# Patient Record
(demographics unavailable — no encounter records)

---

## 2017-03-10 NOTE — ED
General Adult HPI





- General


Chief complaint: Psychiatric Symptoms


Stated complaint: Mental Health


Time Seen by Provider: 03/10/17 12:49


Source: patient, RN notes reviewed, old records reviewed


Mode of arrival: ambulatory


Limitations: no limitations





- History of Present Illness


Initial comments: 





This is a 54-year-old female ER for evaluation of psychiatric not suicidal 

thoughts, patient has history of suicide and depression, she was at [facility] today 

and was sent to ER for evaluation of increasing depression and suicidal ideation

, patient does have a plan wanted chewable tablets and kill herself.





- Related Data


 Home Medications











 Medication  Instructions  Recorded  Confirmed


 


Gabapentin [Neurontin] 300 mg PO BID 02/01/16 03/10/17


 


QUEtiapine FUMARATE [SEROquel XR] 400 mg PO BID 02/01/16 03/10/17


 


Sertraline [Zoloft] 200 mg PO DAILY 02/01/16 03/10/17


 


lamoTRIgine [LaMICtal] 150 mg PO QAM 02/01/16 03/10/17


 


lamoTRIgine [LaMICtal] 200 mg PO HS 02/01/16 03/10/17


 


Albuterol Inhaler [Ventolin Hfa 2 puff INHALATION RT-QID PRN 03/10/17 03/10/17





Inhaler]   


 


Cholecalciferol [Vitamin D3] 2,000 unit PO DAILY 03/10/17 03/10/17


 


Dicyclomine HCl 20 mg PO TID PRN 03/10/17 03/10/17


 


LORazepam [Ativan] 1 mg PO BID PRN 03/10/17 03/10/17


 


Mirtazapine [Remeron] 15 mg PO HS 03/10/17 03/10/17


 


Polyethylene Glycol 3350 [Miralax] 17 gm PO DAILY 03/10/17 03/10/17


 


Stress Formula W/Zinc 1 tab PO DAILY 03/10/17 03/10/17


 


traMADol HCL [Ultram] 50 mg PO Q12H 03/10/17 03/10/17











 Allergies











Allergy/AdvReac Type Severity Reaction Status Date / Time


 


No Known Allergies Allergy   Verified 03/10/17 13:11














Review of Systems


ROS Statement: 


Those systems with pertinent positive or pertinent negative responses have been 

documented in the HPI.





ROS Other: All systems not noted in ROS Statement are negative.





Past Medical History


Past Medical History: Hypertension


History of Any Multi-Drug Resistant Organisms: None Reported


Past Surgical History: Orthopedic Surgery


Past Psychological History: Anxiety, Bipolar, Depression


Smoking Status: Current every day smoker


Past Alcohol Use History: None Reported


Past Drug Use History: None Reported





General Exam


Limitations: no limitations


General appearance: alert, in no apparent distress


Head exam: Present: atraumatic, normocephalic, normal inspection


Eye exam: Present: normal appearance, PERRL, EOMI.  Absent: scleral icterus, 

conjunctival injection, periorbital swelling


ENT exam: Present: normal exam, mucous membranes moist


Neck exam: Present: normal inspection.  Absent: tenderness, meningismus, 

lymphadenopathy


Respiratory exam: Present: normal lung sounds bilaterally.  Absent: respiratory 

distress, wheezes, rales, rhonchi, stridor


Cardiovascular Exam: Present: regular rate, normal rhythm, normal heart sounds.

  Absent: systolic murmur, diastolic murmur, rubs, gallop, clicks


GI/Abdominal exam: Present: soft, normal bowel sounds.  Absent: distended, 

tenderness, guarding, rebound, rigid


Extremities exam: Present: normal inspection, full ROM, normal capillary 

refill.  Absent: tenderness, pedal edema, joint swelling, calf tenderness


Back exam: Present: normal inspection


Neurological exam: Present: alert, oriented X3, CN II-XII intact


Psychiatric exam: Present: normal affect, normal mood


Skin exam: Present: warm, dry, intact, normal color.  Absent: rash





Course


 Vital Signs











  03/10/17





  12:40


 


Temperature 98.1 F


 


Pulse Rate 100


 


Respiratory 18





Rate 


 


Blood Pressure 134/85


 


O2 Sat by Pulse 97





Oximetry 














- Reevaluation(s)


Reevaluation #1: 





03/10/17 13:44


Patient's medically clear for psychiatric evaluation





Medical Decision Making





- Medical Decision Making





54 female the ER for evaluation of mental health, patient was sent to us by [facility] 

will be admitted for psychiatric evaluation and treatment





Disposition


Clinical Impression: 


 Suicidal ideation, Depression





Disposition: TRANSFER TO PSYCH HOSP/UNIT


Condition: Fair


Decision to Admit Reason: Admit from EC
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
## 2017-03-12 NOTE — P.CONS
History of Present Illness





- Reason for Consult


Consult date: 17


Medical management





- History of Present Illness





This is a 54-year-old  female.  Her primary care practitioner is 

Amanda Holder with Dr. Suzanna Voss.  She has a past medical history for 

hypertension on low-fat diet anxiety, depression, bipolar, 2 GI bleeds episodes 

in the past the last one in 2015, tobacco use and dependence.  Patient 

states that she has been depressed and suicidal.  She follows with [id number] and was sent into [facility] emergency center for 

evaluation.  Sodium was 147, potassium 5.2 and chloride 108.  TSH 3.580.  She 

states she was seen by Dr. Farmer on  and was placed on MiraLAX and some 

type of pain pill.





Review of Systems


All systems: negative


Constitutional: Denies chills, Denies fever


Eyes: denies blurred vision, denies pain


Ears, nose, mouth and throat: Denies headache, Denies sore throat


Cardiovascular: Denies chest pain, Denies shortness of breath


Respiratory: Denies cough


Gastrointestinal: Denies abdominal pain, Denies diarrhea, Denies nausea, Denies 

vomiting


Genitourinary: Denies dysuria, Denies hematuria


Musculoskeletal: Denies myalgias


Integumentary: Denies pruritus, Denies rash


Neurological: Denies numbness, Denies weakness


Psychiatric: Reports anxiety, Reports depression, Reports hopelessness, Reports 

suicidal ideation


Endocrine: Denies fatigue, Denies weight change





Past Medical History


Past Medical History: Hypertension


Additional Past Medical History / Comment(s): GI bleed 2 requiring blood 

transfusion the last one in 2015, hypertension on diet control


History of Any Multi-Drug Resistant Organisms: None Reported


Past Surgical History: Hysterectomy, Orthopedic Surgery, Tubal Ligation


Additional Past Surgical History / Comment(s): ACL repair on the right knee, 

right second finger surgery, tubal ligation


Past Psychological History: Anxiety, Bipolar, Depression


Smoking Status: Current every day smoker


Past Alcohol Use History: None Reported


Additional Past Alcohol Use History / Comment(s): Patient is a smoker one pack 

per day for more than 20 years.  She states she was clean from alcohol for 9 

years.  She denies any medical marijuana, marijuana or street drug use.  She is 

single and she has 3 children


Past Drug Use History: None Reported





- Past Family History


  ** Father


Additional Family Medical History / Comment(s): Father  at 75 from COPD.





  ** Mother


Additional Family Medical History / Comment(s): Mother  at age 76 from 

heart failure and COPD





  ** Brother(s)


Additional Family Medical History / Comment(s): Patient has 1 brother with 

history of alcohol abuse currently sober.  Patient does not have any sisters.





Medications and Allergies


 Home Medications











 Medication  Instructions  Recorded  Confirmed  Type


 


Gabapentin [Neurontin] 300 mg PO BID 02/01/16 03/10/17 History


 


QUEtiapine FUMARATE [SEROquel XR] 400 mg PO BID 02/01/16 03/10/17 History


 


Sertraline [Zoloft] 200 mg PO DAILY 02/01/16 03/10/17 History


 


lamoTRIgine [LaMICtal] 150 mg PO QAM 02/01/16 03/10/17 History


 


lamoTRIgine [LaMICtal] 200 mg PO HS 02/01/16 03/10/17 History


 


Albuterol Inhaler [Ventolin Hfa 2 puff INHALATION RT-QID PRN 03/10/17 03/10/17 

History





Inhaler]    


 


Cholecalciferol [Vitamin D3] 2,000 unit PO DAILY 03/10/17 03/10/17 History


 


Dicyclomine HCl 20 mg PO TID PRN 03/10/17 03/10/17 History


 


LORazepam [Ativan] 1 mg PO BID PRN 03/10/17 03/10/17 History


 


Mirtazapine [Remeron] 15 mg PO HS 03/10/17 03/10/17 History


 


Polyethylene Glycol 3350 [Miralax] 17 gm PO DAILY 03/10/17 03/10/17 History


 


Stress Formula W/Zinc 1 tab PO DAILY 03/10/17 03/10/17 History


 


traMADol HCL [Ultram] 50 mg PO Q12H 03/10/17 03/10/17 History











 Allergies











Allergy/AdvReac Type Severity Reaction Status Date / Time


 


No Known Allergies Allergy   Verified 03/10/17 13:11














Physical Exam


Vitals: 


 Vital Signs











  Temp Pulse Resp BP


 


 17 06:40  97.7 F  84  16  114/69








 Intake and Output











 17





 21:59 06:59 14:59


 


Other:   


 


  Weight   71.3 kg








 Patient Weight











 17





 06:59


 


Weight 71.3 kg

















Gen: This is a 54-year-old  female with disheveled appearance.  She is 

cooperative.


HEENT: Head is atraumatic, normocephalic. Pupils equal, round. Sclerae is 

anicteric. 


NECK: Supple. No JVD. No lymphadenopathy. No thyromegaly. 


LUNGS: Clear to auscultation. No wheezes or rhonchi.  No intercostal 

retractions.


HEART: Regular rate and rhythm. No murmur. 


ABDOMEN: Soft. Bowel sounds are present. No masses.  Mild epigastric tenderness 

tenderness.


EXTREMITIES: No pedal edema.  No calf tenderness.


NEUROLOGICAL: Patient is awake, alert and oriented x3. Cranial nerves 2 through 

12 are grossly intact. 








Results


CBC & Chem 7: 


 17 08:01





 17 08:01





Assessment and Plan


Plan: 





1.  Depression with suicidal ideation.  Patient admitted to the mental health 

unit.  Continue current plan of care.





2.  Hypertension on low salt diet.  Continue to monitor.





3.  Tobacco use and dependence.  Continue nicotine patch.





4.  Epigastric tenderness with history of previous GI bleed.  Hemoglobin 

stable.  Continue Protonix 40 mg daily.





5.  Vitamin D efficiency.  Continue supplement.





6.  Possible COPD.  Continue Ventolin inhaler.








Impression and plan of care have been directed as dictated by the signing 

physician.  Madeline Lubin nurse practitioner acting as scribe for signing 

physician.





CC: Amanda Holder NP


Time with Patient: Greater than 30

## 2017-03-12 NOTE — P.PN
Progress Note - Text





Interval history: The patient is found in the hallway she follows me to an 

interview room.  She states her mood is depressed she states she cried all day 

yesterday.  She continues to feel as though she is excessively sleeping.  We 

discussed that it is less usual to use the Seroquel XR twice a day but she 

feels it is helpful administered that way.  We have initiated the Wellbutrin no 

side effect reports so far.  She continues to have hopelessness thinking.  She 

did not attend groups yesterday she is encouraged to begin attending groups 

today.  Appetite impaired she states she did not eat breakfast.





Mental status exam: The patient is alert she's cooperative she endorses a 

depressed mood with hopelessness thinking she is tearful.  She demonstrates 

some psychomotor slowing but is able to ambulate without ataxia.  No homicidal 

ideation no overt symptoms of psychosis hypomania or dedrick.  Insight and 

judgment limited.  She demonstrates no verbal or physical aggressiveness.  She 

is oriented to person place and date.





Plan: The patient will continue on her current medication.  We will review 

community mental health medication review records.  She is encouraged to fully 

participate in the milieu we will monitor her for safety.

## 2017-03-13 NOTE — P.PN
Progress Note - Text





Interval history: The patient is found in the hallway she follows me to an 

interview room.  She continues to feel sad she states she was tearful 

throughout the day yesterday.  She reports that she had a brief episode of 

feeling more normal in the afternoon.  We discussed her psychotropic 

medications again and are likely plan of titrating the Wellbutrin XL further.  

She went to approximate to groups yesterday she plans on attending more today.  

She describes having difficulty with constipation and states that her 

gastroenterologist wants her on MiraLAX twice a day.





Mental status exam: The patient is alert she seated calmly she reports a 

depressed mood with hopelessness thinking.  She endorses continued suicidal 

ideation.  She is briefly tearful during our session and reconstitutes.  There 

is mild psychomotor slowing.  No psychomotor agitation.  Speech is fluent 

spontaneous nonpressured.  She is cooperative.  She is endorsing no auditory or 

visual hallucinations no specific delusions.  She demonstrates no hypomanic or 

manic symptoms.  She remains oriented to person place and date.





Plan: The patient requires continued hospitalization for her acute suicidal 

ideation.  We will consider titrating the Wellbutrin further this is a new 

medication for her.  We will add Colace and increase the frequency of the 

MiraLAX to address her complaint of constipation.  She is encouraged to more 

fully participate in the milieu.  Vital signs reviewed.

## 2017-03-14 NOTE — P.PN
Progress Note - Text





Interval history: The patient is found in the hallway she follows me to an 

interview room.  She reports that today is the first morning she did not wake 

up crying.  She is beginning to feel more upbeat and states she hasn't had any 

suicidal thoughts today so far.  We discussed her medications including 

increasing Wellbutrin XL to 300 mg daily and she is agreeable.  I was able to 

review a recent progress note from [facility].  She reports 

sleeping last night appetite stable.





Mental status exam: The patient is alert she is dressed in her own clothing.  

Eye contact is appropriate speech is fluent.  She is reporting a more 

optimistic mood.  She states this morning is the first time she has not had any 

suicidal thoughts and has not been tearful.  She does engage in the 

conversation.  She is reporting no homicidal ideation intent or plan.  She is 

endorsing no auditory or visual hallucinations there is no evidence of 

hypomanic or manic symptoms.  Insight and judgment improving.  Cognitively she 

is oriented to person place and date.  No evidence of verbal or physical 

aggressiveness.  Affect is demonstrating more range.





Plan: The patient's will continue on her current psychotropic medication 

however we will increase the Wellbutrin XL to 300 mg daily.  We will continue 

to monitor her for safety and encourage her full participation in the milieu.  

Consider discharge in the next 1-2 days.

## 2017-03-15 NOTE — P.DS
Providers


Date of admission: 


03/10/17 16:42





Expected date of discharge: 03/15/17


Attending physician: 


Tod Gurrola





Consults: 





 





03/10/17 16:50


Consult Physician Routine 


   Consulting Provider: Partha Dawn Reason/Comments: H and P


   Do you want consulting provider notified?: Yes











Primary care physician: 


Roselyn Carlos








- Discharge Diagnosis(es)


(1) Bipolar disorder, most recent episode depressed


Current Visit: Yes   Status: Acute   Priority: High   


Hospital Course: 





Brief summary of admission note: This patient is a 54-year-old  

 female who was admitted to the mental health unit through the 

emergency room for acute suicidal ideation.  The patient reported worsening 

mood symptoms over the last 2 weeks including suicidal ideation.  She stated 

her mood was "as low as it can go".  She reported being tearful on a regular 

basis she was experiencing hypersomnia energy is low and anhedonia.  She felt 

hopeless.  She endorsed anxiety symptoms manifesting as panic attacks.  She 

endorsed a history of bipolar disorder.  For full details please refer to my 

psychiatric evaluation dated 03/11/2017.





Summary of hospital course: The patient was admitted to the mental health unit 

voluntarily.  We reviewed her presenting symptoms and medication options.  The 

patient had artery been prescribed 6 psychotropic medications.  We reviewed 

each and ultimately decided to discontinue Neurontin and initiate Wellbutrin XL 

which was eventually titrated to 300 mg daily.  The patient's other 

psychotropic medications were continued.  She was seen for a routine medical 

consultation.  She attended groups she demonstrated no agitated behavior.  

While here in the hospital she reported a progressive improvement of her acute 

symptoms.  She is scheduled to start DBT group and we discussed the benefits of 

that activity.  The patient was able to participate in her activities of daily 

living while on the mental health unit.





Mental status exam: The patient is an alert  female appearing her 

stated age.  She is dressed in her own clothing.  Hygiene grooming adequate.  

Eye contact is appropriate speech is fluent and spontaneous nonpressured.  She 

is reporting no acute suicidal or homicidal ideation intent or plan.  She is 

endorsing no auditory or visual hallucinations and no specific delusions.  

There is no objective evidence of psychosis.  There is no evidence of hypomanic 

or manic symptoms.  Insight and judgment improved area cognitively she remains 

stable she is oriented to person place and date.  There is no verbal or 

physical aggressiveness observed.  Affect is more euthymic and she demonstrates 

an appropriate range of affect.  Thought process is linear there is no evidence 

of circumstantial thinking tangential thinking loose associations or flight of 

ideas.





Impressions


1.  Bipolar disorder most recent depressed, alcohol use disorder in remission, 

rule out panic disorder, rule out PTSD


2.  Cluster B traits


3.  Infrequent back pain, possible asthma, IBS


4.  Psychosocial dysfunction due to exacerbated symptoms of depression





Plan: The patient will be discharged home today from the mental health unit 

following her family meeting.  She will continue on Lamictal 150 mg in the 

morning 200 mg at bedtime, Wellbutrin  mg in the morning, Remeron 15 mg 

at bedtime, Zoloft 200 mg at bedtime, Seroquel  mg in the evening and 

Ativan 1 mg twice daily as needed.  There is no imminent safety risk the 

patient is appropriate for transition back to outpatient services with 

[facility].  I agree with her initiating DBT group.  She is 

instructed to return to the hospital if any acute safety concerns.


Patient Condition at Discharge: Stable





Plan - Discharge Summary


New Discharge Prescriptions: 


Mirtazapine [Remeron] 15 mg PO HS #30 tablet


Nicotine 14Mg/24Hr Patch [Habitrol] 1 patch TRANSDERM DAILY #14 patch


QUEtiapine FUMARATE [Seroquel Xr] 400 mg PO BID #60 tab.er.24h


Sertraline [Zoloft] 200 mg PO DAILY #60 tab


buPROPion XL [Wellbutrin XL] 300 mg PO DAILY #30 tab.er.24h


lamoTRIgine 150 mg PO DAILY #30 tab


lamoTRIgine [LaMICtal Odt] 200 mg PO HS #30 tab


Discharge Medication List





Albuterol Inhaler [Ventolin Hfa Inhaler] 2 puff INHALATION RT-QID PRN 03/10/17 [

History]


Cholecalciferol [Vitamin D3] 2,000 unit PO DAILY 03/10/17 [History]


Dicyclomine HCl 20 mg PO TID PRN 03/10/17 [History]


LORazepam [Ativan] 1 mg PO BID PRN 03/10/17 [History]


Polyethylene Glycol 3350 [Miralax] 17 gm PO DAILY 03/10/17 [History]


Stress Formula W/Zinc 1 tab PO DAILY 03/10/17 [History]


traMADol HCL [Ultram] 50 mg PO Q12H 03/10/17 [History]


Mirtazapine [Remeron] 15 mg PO HS #30 tablet 03/15/17 [Rx]


Nicotine 14Mg/24Hr Patch [Habitrol] 1 patch TRANSDERM DAILY #14 patch 03/15/17 [

Rx]


QUEtiapine FUMARATE [Seroquel Xr] 400 mg PO BID #60 tab.er.24h 03/15/17 [Rx]


Sertraline [Zoloft] 200 mg PO DAILY #60 tab 03/15/17 [Rx]


buPROPion XL [Wellbutrin XL] 300 mg PO DAILY #30 tab.er.24h 03/15/17 [Rx]


lamoTRIgine 150 mg PO DAILY #30 tab 03/15/17 [Rx]


lamoTRIgine [LaMICtal Odt] 200 mg PO HS #30 tab 03/15/17 [Rx]








Follow up Appointment(s)/Referral(s): 


[facility] [Outside] - 1 Week


(w/ Skylar Rush on


3/17/17 @ 11:00am





w/ Malissa Allred on


3/17/17 @ 3:00pm)